# Patient Record
Sex: FEMALE | Race: WHITE
[De-identification: names, ages, dates, MRNs, and addresses within clinical notes are randomized per-mention and may not be internally consistent; named-entity substitution may affect disease eponyms.]

---

## 2021-10-07 ENCOUNTER — HOSPITAL ENCOUNTER (OUTPATIENT)
Dept: HOSPITAL 47 - FBPOP | Age: 26
Discharge: HOME | End: 2021-10-07
Attending: OBSTETRICS & GYNECOLOGY
Payer: COMMERCIAL

## 2021-10-07 VITALS
SYSTOLIC BLOOD PRESSURE: 140 MMHG | TEMPERATURE: 98.1 F | HEART RATE: 107 BPM | RESPIRATION RATE: 16 BRPM | DIASTOLIC BLOOD PRESSURE: 81 MMHG

## 2021-10-07 DIAGNOSIS — R55: ICD-10-CM

## 2021-10-07 DIAGNOSIS — O26.892: Primary | ICD-10-CM

## 2021-10-07 DIAGNOSIS — Z3A.22: ICD-10-CM

## 2021-10-07 LAB
BASOPHILS # BLD AUTO: 0 K/UL (ref 0–0.2)
BASOPHILS NFR BLD AUTO: 0 %
EOSINOPHIL # BLD AUTO: 0.1 K/UL (ref 0–0.7)
EOSINOPHIL NFR BLD AUTO: 1 %
ERYTHROCYTE [DISTWIDTH] IN BLOOD BY AUTOMATED COUNT: 3.91 M/UL (ref 3.8–5.4)
ERYTHROCYTE [DISTWIDTH] IN BLOOD: 13.7 % (ref 11.5–15.5)
GLUCOSE BLD-MCNC: 126 MG/DL (ref 75–99)
HCT VFR BLD AUTO: 35.1 % (ref 34–46)
HGB BLD-MCNC: 12.1 GM/DL (ref 11.4–16)
LYMPHOCYTES # SPEC AUTO: 1.7 K/UL (ref 1–4.8)
LYMPHOCYTES NFR SPEC AUTO: 15 %
MCH RBC QN AUTO: 31 PG (ref 25–35)
MCHC RBC AUTO-ENTMCNC: 34.6 G/DL (ref 31–37)
MCV RBC AUTO: 89.6 FL (ref 80–100)
MONOCYTES # BLD AUTO: 0.4 K/UL (ref 0–1)
MONOCYTES NFR BLD AUTO: 3 %
NEUTROPHILS # BLD AUTO: 9.2 K/UL (ref 1.3–7.7)
NEUTROPHILS NFR BLD AUTO: 80 %
PLATELET # BLD AUTO: 193 K/UL (ref 150–450)
WBC # BLD AUTO: 11.5 K/UL (ref 3.8–10.6)

## 2021-10-07 PROCEDURE — 96360 HYDRATION IV INFUSION INIT: CPT

## 2021-10-07 PROCEDURE — 96361 HYDRATE IV INFUSION ADD-ON: CPT

## 2021-10-07 PROCEDURE — 99214 OFFICE O/P EST MOD 30 MIN: CPT

## 2021-10-07 PROCEDURE — 85025 COMPLETE CBC W/AUTO DIFF WBC: CPT

## 2021-10-07 PROCEDURE — 96367 TX/PROPH/DG ADDL SEQ IV INF: CPT

## 2021-10-13 NOTE — P.MSEPDOC
Presenting Problems





- Arrival Data


Date of Arrival on Unit: 10/07/21


Time of Arrival on Unit: 10:32


Mode of Transport: Ambulatory





- Complaint


OB-Reason for Admission/Chief Complaint: Syncope/Fainting Spell


Comment: pt arrived c/o passing out at [facility] today pt got lighthedad and her 

friend lowered her to the floor





Prenatal Medical History





- Pregnancy Information


: 2


Para: 1


Term: 1


: 0


Abortions: Spontaneous or Elective: 0


Number of Living Children: 1





- Gestational Age


Gestational Age by VIANNEY (wks/days): 22 Weeks and 3 Days





Review of Systems





- Review of Systems


Constitutional: No problems


Breast: No problems


ENT: No problems


Cardiovascular: No problems


Respiratory: No problems


Gastrointestinal: No problems


Genitourinary: No problems


Musculoskeletal: No problems


Neurological: No problems


Skin: No problems





Vital Signs





- Temperature


Temperature: 98.1 F


Temperature Source: Oral





- Pulse


  ** Right Brachial


Pulse Rate: 107


Pulse Assessment Method: Automatic Cuff





- Respirations


Respiratory Rate: 16


Oxygen Delivery Method: Room Air


O2 Sat by Pulse Oximetry: 98





- Blood Pressure


  ** Right Arm


Blood Pressure: 140/81


Blood Pressure Mean: 100


Blood Pressure Source: Automatic Cuff





Medical Screen Scoring





- Cervical Exam


Membranes: Intact





- Uterine Contractions


Resting: Soft to palpation





- Fetal Assessment - Baby A


Baseline FHR: 140





Physician Notification





- Physician Notified


Physician Notified Date: 10/07/21


Physician Notified Time: 13:30


Physician: Dr Cason


New Order Received: Yes





- Notification Comment


Comment: may discharge to home with instructions and have her keep her scheduled

appoinment





Maternal Fetal Triage Index





- Maternal Fetal Triage Index


Presenting for scheduled procedure w/no complaint: No





- Stat/Priority 1


Stat Priority 1: No





- Urgent/Priority 2


Urgent Priority 2: No





- Prompt/Priority 3


Prompt Priority 3: No





- Non-Urgent/Priority 4


Non-Urgent Priority 4: Yes


Criteria Met for Priority 4: pt 22 3/7 weeks gestation. had a episode at [facility]

where she  passed out. B/P 140/81 PULSE 107   accuc check of 126 obtained. and 

cbc drawn and sent to lab and iv of d5lr started v/s retaken onB/P 116/59 Pulse 

92 DR Cason called with cbc results and orders receive may discharge pt to home 

with instructions





Disposition





- Disposition


OB Disposition: Physician follow up in office, Discharge to home


Discharge Date: 10/07/21


Discharge Time: 13:35


I agree with the RN Medical Screening Exam: Yes


Case reviewed; plan agreed upon as documented in EMR&OBIX.: Yes


Comments: 





Patient was neither seen nor examined by me


Diagnosis: PAIN, UNSPECIFIED

## 2021-11-11 ENCOUNTER — HOSPITAL ENCOUNTER (OUTPATIENT)
Dept: HOSPITAL 47 - FBPOP | Age: 26
LOS: 1 days | End: 2021-11-12
Attending: OBSTETRICS & GYNECOLOGY
Payer: COMMERCIAL

## 2021-11-11 DIAGNOSIS — Z3A.27: ICD-10-CM

## 2021-11-11 DIAGNOSIS — O46.92: Primary | ICD-10-CM

## 2021-11-11 DIAGNOSIS — Z88.1: ICD-10-CM

## 2021-11-11 PROCEDURE — 99213 OFFICE O/P EST LOW 20 MIN: CPT

## 2021-11-12 VITALS
DIASTOLIC BLOOD PRESSURE: 67 MMHG | SYSTOLIC BLOOD PRESSURE: 124 MMHG | TEMPERATURE: 97.7 F | HEART RATE: 119 BPM | RESPIRATION RATE: 16 BRPM

## 2021-11-12 NOTE — P.PN
Progress Note - Text


Progress Note Date: 21





this is a 26-year-old  2 para 1 woman who presented to labor and delivery

triage at 27-4/7 weeks gestation complaining of 2 days of vaginal bleeding.





She reports episodes of pink spotting over the last 48 hours.  No flow of bright

red blood.  More recently this evening she had some darker brown old blood type 

discharge.  She decided to come in for evaluation.  She has had ongoing and 

unrelenting pelvic pain throughout the pregnancy.  She reports this is below her

old  scar at the level of the pubic bone.  Her regular obstetrician is 

aware of this.  She reports the pain has not increased or changed in nature in 

light of the spotting.  She denies bowel or bladder symptoms.  She's not had 

recent intercourse or trauma.  She is very active at her job as a .





Throughout her evaluation on labor and delivery triage no active bleeding was 

noted.  Fetal heart tones were reassuring for gestational age and she was not 

yoandy.





I performed bedside exam.  The abdomen is gravid, soft and nontender.  She had 

no CVA tenderness.  She is exquisitely tender over the pubic symphysis 

consistent with pubic symphysis separation.  On speculum examination there is no

blood in the vault.  On bimanual examination the cervix is fingertip, firm, 30% 

effaced with no presenting part.  There is no blood on the exam glove.





Throughout her term evaluation there was no active bleeding noted.  All of her 

vital signs are stable.  She was therefore discharged home to pelvic rest and 

off of work until seen in follow-up in the office.   labor and bleeding 

precautions were reviewed in detail.





Greater than 30 minutes spent.

## 2021-12-14 ENCOUNTER — HOSPITAL ENCOUNTER (OUTPATIENT)
Dept: HOSPITAL 47 - FBPOP | Age: 26
Discharge: HOME | End: 2021-12-14
Attending: OBSTETRICS & GYNECOLOGY
Payer: COMMERCIAL

## 2021-12-14 VITALS
TEMPERATURE: 98.1 F | RESPIRATION RATE: 20 BRPM | SYSTOLIC BLOOD PRESSURE: 117 MMHG | DIASTOLIC BLOOD PRESSURE: 68 MMHG | HEART RATE: 115 BPM

## 2021-12-14 DIAGNOSIS — R60.0: ICD-10-CM

## 2021-12-14 DIAGNOSIS — Z3A.32: ICD-10-CM

## 2021-12-14 DIAGNOSIS — Z88.1: ICD-10-CM

## 2021-12-14 DIAGNOSIS — R51.9: ICD-10-CM

## 2021-12-14 DIAGNOSIS — O26.893: Primary | ICD-10-CM

## 2021-12-14 LAB
PH UR: 6 [PH] (ref 5–8)
PROT UR QL: (no result)
RBC UR QL: 2 /HPF (ref 0–5)
SP GR UR: 1.03 (ref 1–1.03)
SQUAMOUS UR QL AUTO: 3 /HPF (ref 0–4)
UROBILINOGEN UR QL STRIP: <2 MG/DL (ref ?–2)
WBC #/AREA URNS HPF: 2 /HPF (ref 0–5)

## 2021-12-14 PROCEDURE — 81001 URINALYSIS AUTO W/SCOPE: CPT

## 2021-12-14 PROCEDURE — 59025 FETAL NON-STRESS TEST: CPT

## 2021-12-14 PROCEDURE — 99213 OFFICE O/P EST LOW 20 MIN: CPT

## 2021-12-27 ENCOUNTER — HOSPITAL ENCOUNTER (OUTPATIENT)
Dept: HOSPITAL 47 - FBPOP | Age: 26
Discharge: HOME | End: 2021-12-27
Attending: OBSTETRICS & GYNECOLOGY
Payer: COMMERCIAL

## 2021-12-27 VITALS
RESPIRATION RATE: 18 BRPM | DIASTOLIC BLOOD PRESSURE: 79 MMHG | TEMPERATURE: 96.5 F | HEART RATE: 108 BPM | SYSTOLIC BLOOD PRESSURE: 136 MMHG

## 2021-12-27 DIAGNOSIS — Z03.79: Primary | ICD-10-CM

## 2021-12-27 DIAGNOSIS — Z88.1: ICD-10-CM

## 2021-12-27 PROCEDURE — 59025 FETAL NON-STRESS TEST: CPT

## 2021-12-27 PROCEDURE — 99213 OFFICE O/P EST LOW 20 MIN: CPT

## 2021-12-27 PROCEDURE — 84112 EVAL AMNIOTIC FLUID PROTEIN: CPT

## 2022-01-08 NOTE — P.MSEPDOC
Presenting Problems





- Arrival Data


Date of Arrival on Unit: 21


Time of Arrival on Unit: 12:04


Mode of Transport: Ambulatory





- Complaint


OB-Reason for Admission/Chief Complaint: Headache, Visual Disturbances


Comment: pt arrived c/o a headache times 4 days. with c/o edema in feet and also

c/o seeing spots





Prenatal Medical History





- Pregnancy Information


: 2


Para: 1


Term: 1


: 0


Abortions: Spontaneous or Elective: 0


Number of Living Children: 1





- Gestational Age


Gestational Age by VIANNEY (wks/days): 32 Weeks and 1 Days





Review of Systems





- Review of Systems


Constitutional: No problems


Breast: No problems


ENT: No problems


Cardiovascular: No problems


Respiratory: No problems


Gastrointestinal: No problems


Genitourinary: No problems


Musculoskeletal: No problems


Neurological: Dizziness


Skin: No problems





Vital Signs





- Temperature


Temperature: 98.1 F


Temperature Source: Oral





- Pulse


  ** Right Brachial


Pulse Rate: 115


Pulse Assessment Method: Automatic Cuff





- Respirations


Respiratory Rate: 20


Oxygen Delivery Method: Room Air


O2 Sat by Pulse Oximetry: 98





- Blood Pressure


  ** Right Arm


Blood Pressure: 117/68


Blood Pressure Mean: 84


Blood Pressure Source: Automatic Cuff





Medical Screen Scoring





- Fetal Assessment - Baby A


Baseline FHR: 150


Fetal Heart Rate - NICHD Category: Category I (Normal)





Physician Notification





- Physician Notified


Physician Notified Date: 21


Physician Notified Time: 12:55


Physician: dr rodriguez


New Order Received: Yes





- Notification Comment


Comment: may discharge to home with  instructions and have pt f/u with dr rodriguez 

next week. pt needs to call and make appoinment





Maternal Fetal Triage Index





- Non-Urgent/Priority 4


Non-Urgent Priority 4: Yes


Criteria Met for Priority 4: pt 32 1/7 weeks c/o h/a  b/p 117/68 temp 98.1 pulse

115. reactive NST





Disposition





- Disposition


OB Disposition: Physician follow up in office, Discharge to home


Discharge Date: 21


Discharge Time: 13:15


I agree with the RN Medical Screening Exam: Yes


Case reviewed; plan agreed upon as documented in EMR&OBIX.: Yes


Diagnosis: HEADACHE * DO NOT USE *

## 2022-01-15 ENCOUNTER — HOSPITAL ENCOUNTER (INPATIENT)
Dept: HOSPITAL 47 - FBPOP | Age: 27
LOS: 4 days | Discharge: HOME | End: 2022-01-19
Attending: OBSTETRICS & GYNECOLOGY | Admitting: OBSTETRICS & GYNECOLOGY
Payer: COMMERCIAL

## 2022-01-15 DIAGNOSIS — O36.63X0: ICD-10-CM

## 2022-01-15 DIAGNOSIS — Z88.1: ICD-10-CM

## 2022-01-15 DIAGNOSIS — N73.6: ICD-10-CM

## 2022-01-15 DIAGNOSIS — O36.8130: ICD-10-CM

## 2022-01-15 DIAGNOSIS — Z3A.37: ICD-10-CM

## 2022-01-15 DIAGNOSIS — O34.211: Primary | ICD-10-CM

## 2022-01-15 DIAGNOSIS — G43.909: ICD-10-CM

## 2022-01-15 DIAGNOSIS — O40.3XX0: ICD-10-CM

## 2022-01-15 LAB
APTT BLD: 21.8 SEC (ref 22–30)
BASOPHILS # BLD AUTO: 0 K/UL (ref 0–0.2)
BASOPHILS NFR BLD AUTO: 0 %
EOSINOPHIL # BLD AUTO: 0.1 K/UL (ref 0–0.7)
EOSINOPHIL NFR BLD AUTO: 1 %
ERYTHROCYTE [DISTWIDTH] IN BLOOD BY AUTOMATED COUNT: 4.2 M/UL (ref 3.8–5.4)
ERYTHROCYTE [DISTWIDTH] IN BLOOD: 15.8 % (ref 11.5–15.5)
HCT VFR BLD AUTO: 36.2 % (ref 34–46)
HGB BLD-MCNC: 12.2 GM/DL (ref 11.4–16)
INR PPP: 0.9 (ref ?–1.2)
LYMPHOCYTES # SPEC AUTO: 1.9 K/UL (ref 1–4.8)
LYMPHOCYTES NFR SPEC AUTO: 17 %
MCH RBC QN AUTO: 29.1 PG (ref 25–35)
MCHC RBC AUTO-ENTMCNC: 33.7 G/DL (ref 31–37)
MCV RBC AUTO: 86.2 FL (ref 80–100)
MONOCYTES # BLD AUTO: 0.4 K/UL (ref 0–1)
MONOCYTES NFR BLD AUTO: 4 %
NEUTROPHILS # BLD AUTO: 8.2 K/UL (ref 1.3–7.7)
NEUTROPHILS NFR BLD AUTO: 75 %
PLATELET # BLD AUTO: 186 K/UL (ref 150–450)
PT BLD: 10.1 SEC (ref 9–12)
WBC # BLD AUTO: 11 K/UL (ref 3.8–10.6)

## 2022-01-15 PROCEDURE — 85730 THROMBOPLASTIN TIME PARTIAL: CPT

## 2022-01-15 PROCEDURE — 59025 FETAL NON-STRESS TEST: CPT

## 2022-01-15 PROCEDURE — 85610 PROTHROMBIN TIME: CPT

## 2022-01-15 PROCEDURE — 4A0HXCZ MEASUREMENT OF PRODUCTS OF CONCEPTION, CARDIAC RATE, EXTERNAL APPROACH: ICD-10-PCS

## 2022-01-15 PROCEDURE — 99213 OFFICE O/P EST LOW 20 MIN: CPT

## 2022-01-15 PROCEDURE — 86900 BLOOD TYPING SEROLOGIC ABO: CPT

## 2022-01-15 PROCEDURE — 86850 RBC ANTIBODY SCREEN: CPT

## 2022-01-15 PROCEDURE — 86901 BLOOD TYPING SEROLOGIC RH(D): CPT

## 2022-01-15 PROCEDURE — 83036 HEMOGLOBIN GLYCOSYLATED A1C: CPT

## 2022-01-15 PROCEDURE — 85025 COMPLETE CBC W/AUTO DIFF WBC: CPT

## 2022-01-15 RX ADMIN — POTASSIUM CHLORIDE SCH MLS/HR: 14.9 INJECTION, SOLUTION INTRAVENOUS at 23:44

## 2022-01-15 RX ADMIN — DIPHENHYDRAMINE HYDROCHLORIDE PRN MG: 50 INJECTION, SOLUTION INTRAMUSCULAR; INTRAVENOUS at 14:27

## 2022-01-15 RX ADMIN — DOCUSATE SODIUM AND SENNOSIDES SCH: 50; 8.6 TABLET ORAL at 20:15

## 2022-01-15 RX ADMIN — POTASSIUM CHLORIDE SCH MLS/HR: 14.9 INJECTION, SOLUTION INTRAVENOUS at 14:28

## 2022-01-15 RX ADMIN — ACETAMINOPHEN SCH MG: 500 TABLET ORAL at 20:14

## 2022-01-15 RX ADMIN — DIPHENHYDRAMINE HYDROCHLORIDE PRN MG: 50 INJECTION, SOLUTION INTRAMUSCULAR; INTRAVENOUS at 20:15

## 2022-01-15 RX ADMIN — IBUPROFEN SCH MG: 600 TABLET ORAL at 23:44

## 2022-01-15 RX ADMIN — IBUPROFEN SCH: 600 TABLET ORAL at 20:38

## 2022-01-15 NOTE — P.HPOB
History of Present Illness


H&P Date: 01/15/22


Chief Complaint: Decreased fetal movement and back pain





This is a 26-year-old  3 para 1011 woman with an estimated due date of 

2022 based on first trimester ultrasound.  She presents at 36-5/7 weeks 

gestation complaining of decreased fetal movement for approximately 12 hours and

increasing low back and abdominal pain.  Upon evaluation in labor and delivery 

triage she has a category 1 fetal heart rate tracing and does report feeling 

fetal movement.  She is however found to be yoandy regularly on the monitor

and does describe low back pain worsening over the last 24 hours and wrapping 

around to the front.  She describes increasing pelvic pressure.  On examination 

she is found to be 3+ centimeters dilated, 70% effaced in the vertex in the -1 

station.  This is a change from recent exam in which she reported she was told 

she was 1 cm dilated.





Pregnancy has been complicated by large for gestational age infant based on 

recent ultrasound.  Estimated fetal weight at 36 weeks was greater than the 90th

percentile with an DK of 28.8 cm.





Previous pregnancy was delivered by primary low transverse  section in 

 after a failed vacuum extraction.  Infant weighed 10 pounds.  The patient 

reports that she was in the hospital for 6 days and did receive a blood 

transfusion intraoperatively for bleeding.  The infant also had an undiagnosed 

cardiac defect and did require prolonged [facility].  This was in [place].  She has been previously counseled on in the 

office setting regarding options and does decline a trial of labor.  She has 

been consented for a repeat low transverse  section.  Risks of the C-

section are reviewed with the patient today and include bleeding, transfusion, 

infection, injury to maternal or infant, DVT, PE, implications for future 

pregnancies and/or anesthesia complications.  The patient understands these 

risks and has been counseled previously in the office as well.  Consent is 

obtained.





Laboratory data: Blood type O+, antibody screen negative, rubella immune, VDRL 

nonreactive, hepatitis B surface antigen negative, gonorrhea and clinic cultures

negative, HIV negative, glucose tolerance testing within normal limits.





Review of Systems


All systems: negative





Past Medical History


Additional Past Medical History / Comment(s): Migraine headaches


History of Any Multi-Drug Resistant Organisms: None Reported


Past Surgical History:  Section (2016)


Past Anesthesia/Blood Transfusion Reactions: No Reported Reaction


Past Psychological History: No Psychological Hx Reported


Smoking Status: Never smoker


Past Alcohol Use History: None Reported


Past Drug Use History: None Reported





Medications and Allergies


                                Home Medications











 Medication  Instructions  Recorded  Confirmed  Type


 


Pedi Multivit No.25/Folic Acid 2 tab PO DAILY 10/07/21 01/15/22 History





[Flintstones Multivit Chew Tab]    








                                    Allergies











Allergy/AdvReac Type Severity Reaction Status Date / Time


 


amoxicillin Allergy  Rash/Hives Verified 01/15/22 10:23














Exam


                                Intake and Output











 01/14/22 01/15/22 01/15/22





 22:59 06:59 14:59


 


Other:   


 


  Weight   127.006 kg














This is a pleasant, visibly gravid  female in no obvious distress.  

HEENT ENT exam is unremarkable.  Her breathing is unlabored and her heart is a 

regular rate and rhythm.  The abdomen is gravid with size significantly greater 

then stated gestational age.  On pelvic examination she is 3+ centimeters 

dilated, 70% effaced vertex in the -1 station.  She has 1+ bilateral lower 

extremity edema.  Fetal heart tones are category 1.  She is yoandy every 1-

4 minutes.





Assessment and Plan


(1) 36 to 37 weeks gestation of pregnancy


Current Visit: Yes   Status: Acute   Code(s): WAD1540 -    SNOMED Code(s): 

009714305


   





(2) History of 


Current Visit: Yes   Status: Acute   Code(s): Z98.891 - HISTORY OF UTERINE SCAR 

FROM PREVIOUS SURGERY   SNOMED Code(s): 239184441


   





(3) Polyhydramnios


Current Visit: Yes   Status: Acute   Code(s): O40.9XX0 - POLYHYDRAMNIOS, UNSP 

TRIMESTER, NOT APPLICABLE OR UNSP   SNOMED Code(s): 33572043


   





(4) Spontaneous onset of labor


Current Visit: Yes   Status: Acute   Code(s): KST2876 -    SNOMED Code(s): 

21307488


   


Plan: 





26-year-old  3 para 1011 woman who is admitted at 36-5/7 weeks gestation 

in early active labor.  She has a history of a previous low transverse  

section and declines trial of labor.  Pregnancy has been, located by large for 

gestational age estimated fetal weight on recent ultrasound as well as 

polyhydramnios.  She reports a history of hemorrhage with previous  

section 5 years ago.  Risks benefits and alternatives of repeat  have 

been reviewed with the patient in detail, please see the documented discussion 

above.  Consent is obtained.  The anesthesiologist has been notified as has the 

 nursery.  We'll proceed towards repeat low transverse  section.

## 2022-01-15 NOTE — P.OP
Date of Procedure: 01/15/22


Preoperative Diagnosis: 


Intrauterine pregnancy at 36-5/7 weeks


Spontaneous labor


History of previous low transverse  section


Polyhydramnios


Suspected large for gestational age infant


Postoperative Diagnosis: 


Uterine pregnancy at 36-5/7 weeks


Spontaneous labor


History of previous low transverse  section


Polyhydramnios


LGA


Intra-abdominal adhesions


Procedure(s) Performed: 


repeat low transverse  section


Anesthesia: spinal


Surgeon: Maame Monae


Assistant #1: Nitin Gandhi


Estimated Blood Loss (ml): 400


IV fluids (ml): 1,200


Urine output (ml): 400


Pathology: none sent


Condition: stable


Disposition: floor


Indications for Procedure: 


26-year-old  3 para 1011 woman who presented at 36-5/7 weeks gestation in

spontaneous active labor.  Should a history of a previous low transverse 

 section and declines trial of labor.


Operative Findings: 


Female infant in the vertex occiput transverse position with Apgars of 7 at 1 

minute and 9 at 5 minutes weighing 8 lbs. 9 oz., 3890 g.  Extensive intra-

abdominal adhesions involving the omentum to the anterior of the uterus, the 

right ovary and fallopian tube to the posterior portion of the uterus and dense 

adhesions of the bladder to the low uterine segment.


Description of Procedure: 


Patient was taken to the operating room where spinal anesthetic was administered

without incident.  Appropriate timeout procedure was undertaken.  On she was 

positioned, prepped and draped in the dorsal supine position with a Nolen 

catheter in place.  After anesthetic was confirmed adequate a low transverse 

skin incision was made and carried down to the underlying fascia sharply.  The 

fascia was incised in the midline and extended bilaterally with the Pratt 

scissors.  The rectus muscles were densely adherent in the midline.  Hemostats 

were utilized to elevate the rectus muscles in the midline and scalpel was 

utilized to enter the peritoneum.  The peritoneum was entered and the incision 

was extended inferiorly and superiorly.  There is noted to be omentum adherent 

to the entire right on fundal to midportion of the uterus.  The bladder blade 

was placed and the vesicouterine peritoneum was identified.  The bladder was 

noted to be densely adherent to the low uterine segment therefore the uterine 

incision was made well above this.  The uterine incision was then carried down 

to the underlying membranes sharply.  The membranes were ruptured and copious 

clear fluid was noted.  The uterine incision was extended bilaterally bluntly.  

The head was delivered from the incision and the nose and mouth were bulb 

suctioned.  The rest the infant was delivered onto the field and the nose and 

mouth were further bulb suctioned.  The cord was clamped and cut and the infant 

was taken to the warmer.  An intact, three-vessel cord placenta was then 

manually removed.  The uterus was carefully removed from the abdomen and there 

is noted to be extensive heart scarring of the right adnexa to the posterior 

portion of the right uterus.  The omentum was noted to be very adherent to the 

right fundal and right midportion of the uterus as well.  The uterine incision 

was delineated with Smiley's after the uterus was cleared of all clot and 

debris.  The uterine incision was closed in a running 5 locked fashion with 0 

Vicryl suture followed by second imbricating layer of the same.  There was noted

to be active bleeding from a venous sinus essentially at the dome of the bladder

where it was adherent to the low uterine segment.  This was very carefully 

exposed the bleeding was felt to be superficial venous sinus.  3-0 Vicryl suture

was utilized to very carefully place figure-of-eight sutures 2 in this area and

hemostasis was noted.  The entire area was vascular.  Surgicel powder was then 

placed over the entire low uterine segment and dome of the bladder.  Hemostasis 

was noted.  The uterus had been returned to the abdomen prior to this.  The 

gutters were cleared of all clot and debris on the left side the right side was 

not secondary to adhesions.  The peritoneum was then reapproximated in the 

midline with 2-0 Vicryl suture.  The rectus muscles and fascial edges and p

eritoneal edges had been inspected and are noted to be hemostatic.  The fascia 

was then closed in a running fashion with 0 Vicryl suture.  The subcuticular 

tissue is irrigated and reapproximated with 3-0 chromic.  The skin was then 

closed in a subcutaneous fashion with 4-0 Vicryl suture.  All counts reported to

me as correct by the operating room staff.  Nolen catheter is in place and 

copious clear urine is noted in the tubing at the end of the procedure.  The 

patient was transported recovery area in good condition.

## 2022-01-16 LAB
BASOPHILS # BLD AUTO: 0 K/UL (ref 0–0.2)
BASOPHILS NFR BLD AUTO: 0 %
EOSINOPHIL # BLD AUTO: 0.1 K/UL (ref 0–0.7)
EOSINOPHIL NFR BLD AUTO: 1 %
ERYTHROCYTE [DISTWIDTH] IN BLOOD BY AUTOMATED COUNT: 3.7 M/UL (ref 3.8–5.4)
ERYTHROCYTE [DISTWIDTH] IN BLOOD: 15.4 % (ref 11.5–15.5)
HCT VFR BLD AUTO: 32.1 % (ref 34–46)
HGB BLD-MCNC: 11 GM/DL (ref 11.4–16)
LYMPHOCYTES # SPEC AUTO: 2.7 K/UL (ref 1–4.8)
LYMPHOCYTES NFR SPEC AUTO: 18 %
MCH RBC QN AUTO: 29.7 PG (ref 25–35)
MCHC RBC AUTO-ENTMCNC: 34.3 G/DL (ref 31–37)
MCV RBC AUTO: 86.8 FL (ref 80–100)
MONOCYTES # BLD AUTO: 1 K/UL (ref 0–1)
MONOCYTES NFR BLD AUTO: 7 %
NEUTROPHILS # BLD AUTO: 11.2 K/UL (ref 1.3–7.7)
NEUTROPHILS NFR BLD AUTO: 73 %
PLATELET # BLD AUTO: 188 K/UL (ref 150–450)
WBC # BLD AUTO: 15.2 K/UL (ref 3.8–10.6)

## 2022-01-16 RX ADMIN — ACETAMINOPHEN SCH MG: 500 TABLET ORAL at 09:00

## 2022-01-16 RX ADMIN — IBUPROFEN SCH MG: 600 TABLET ORAL at 11:51

## 2022-01-16 RX ADMIN — POTASSIUM CHLORIDE SCH: 14.9 INJECTION, SOLUTION INTRAVENOUS at 06:07

## 2022-01-16 RX ADMIN — IBUPROFEN SCH MG: 600 TABLET ORAL at 06:06

## 2022-01-16 RX ADMIN — DOCUSATE SODIUM AND SENNOSIDES SCH EACH: 50; 8.6 TABLET ORAL at 09:00

## 2022-01-16 RX ADMIN — ACETAMINOPHEN SCH MG: 500 TABLET ORAL at 01:41

## 2022-01-16 RX ADMIN — POTASSIUM CHLORIDE SCH: 14.9 INJECTION, SOLUTION INTRAVENOUS at 16:51

## 2022-01-16 RX ADMIN — IBUPROFEN SCH MG: 600 TABLET ORAL at 23:38

## 2022-01-16 RX ADMIN — DOCUSATE SODIUM AND SENNOSIDES SCH: 50; 8.6 TABLET ORAL at 20:16

## 2022-01-16 RX ADMIN — IBUPROFEN SCH MG: 600 TABLET ORAL at 18:25

## 2022-01-16 RX ADMIN — ACETAMINOPHEN SCH MG: 500 TABLET ORAL at 14:20

## 2022-01-16 RX ADMIN — ACETAMINOPHEN SCH MG: 500 TABLET ORAL at 19:35

## 2022-01-16 NOTE — P.PNOBGPC
Subjective





- Subjective


Principal diagnosis: Postop day 1


Interval history: 





Feeling well.  Nolen catheter remained in overnight secondary to concerns for 

bleeding and scarring at the dome of the bladder intraoperatively.  Clear urine 

is noted.  Infant is in the nursery with a elevated white blood cell count.


Patient reports: Reports appetite normal, Reports pain well controlled, Reports 

ambulating normally, Denies dizzy ambulation, Denies nauseated


: doing well, other (Nursery)





Objective





- Vital Signs


Latest vital signs: 


                                   Vital Signs











  Temp Pulse Resp BP Pulse Ox


 


 22 08:00  97.5 F L  103 H  16  106/71  99


 


 22 04:00  98.0 F  75  16  136/88 


 


 01/15/22 23:48  98.3 F  97  16  128/75 


 


 01/15/22 20:00  98.0 F  112 H  16  109/67 


 


 01/15/22 15:56   90  16  112/55  96


 


 01/15/22 15:26   100  16  111/56  96


 


 01/15/22 14:56   107 H  16  110/58  98


 


 01/15/22 14:41   101 H  16  107/58  95


 


 01/15/22 14:26   90  16  107/57  98


 


 01/15/22 14:11   95  16  102/52  98


 


 01/15/22 13:56  97.8 F  94  16  90/45  97


 


 01/15/22 11:15  97.5 F L  126 H  16  126/74  98








                                Intake and Output











 01/15/22 01/16/22 01/16/22





 22:59 06:59 14:59


 


Output Total 350 1300 500


 


Balance -350 -1300 -500


 


Output:   


 


  Urine 350 1300 500


 


Other:   


 


  Voiding Method  Indwelling Catheter Indwelling Catheter














- Exam


Extremities: Present: normal, edema


Abdomen: Present: normal appearance, soft.  Absent: distention, tenderness


Incision: Present: normal, dry, intact, dressed


Uterus: Present: normal, firm.  Absent: tenderness





- Labs


Labs: 


                  Abnormal Lab Results - Last 24 Hours (Table)











  01/15/22 01/15/22 01/16/22 Range/Units





  11:15 11:28 05:56 


 


WBC  11.0 H   15.2 H  (3.8-10.6)  k/uL


 


RBC    3.70 L  (3.80-5.40)  m/uL


 


Hgb    11.0 L  (11.4-16.0)  gm/dL


 


Hct    32.1 L  (34.0-46.0)  %


 


RDW  15.8 H    (11.5-15.5)  %


 


Neutrophils #  8.2 H   11.2 H  (1.3-7.7)  k/uL


 


APTT   21.8 L   (22.0-30.0)  sec














Assessment and Plan


(1) 36 to 37 weeks gestation of pregnancy


Current Visit: Yes   Status: Acute   Code(s): EWT2515 -    SNOMED Code(s): 

797236312


   





(2) History of 


Current Visit: Yes   Status: Acute   Code(s): Z98.891 - HISTORY OF UTERINE SCAR 

FROM PREVIOUS SURGERY   SNOMED Code(s): 339135526


   





(3) Polyhydramnios


Current Visit: Yes   Status: Acute   Code(s): O40.9XX0 - POLYHYDRAMNIOS, UNSP 

TRIMESTER, NOT APPLICABLE OR UNSP   SNOMED Code(s): 09061195


   





(4) Spontaneous onset of labor


Current Visit: Yes   Status: Acute   Code(s): KAZ1509 -    SNOMED Code(s): 

92698607


   


Plan: 





Postop day 1 status post repeat low transverse  section.  We'll 

discontinue Nolen catheter to day.  Increase ambulation and routine care 

otherwise.

## 2022-01-16 NOTE — P.PN
Progress Note - Text





22  722


76-year-old female status post  with spinal Duramorph.  Patient seen 

and evaluated this morning for postop pain control, patient has a VAS of 1 with 

no complains of nausea vomiting.  Patient does have complaints of pruritus which

should subside in 24 hours.

## 2022-01-17 RX ADMIN — DOCUSATE SODIUM AND SENNOSIDES SCH: 50; 8.6 TABLET ORAL at 09:49

## 2022-01-17 RX ADMIN — DOCUSATE SODIUM AND SENNOSIDES SCH: 50; 8.6 TABLET ORAL at 19:45

## 2022-01-17 RX ADMIN — ACETAMINOPHEN SCH MG: 500 TABLET ORAL at 22:27

## 2022-01-17 RX ADMIN — IBUPROFEN SCH MG: 600 TABLET ORAL at 12:56

## 2022-01-17 RX ADMIN — ACETAMINOPHEN SCH: 500 TABLET ORAL at 02:03

## 2022-01-17 RX ADMIN — ACETAMINOPHEN SCH MG: 500 TABLET ORAL at 16:11

## 2022-01-17 RX ADMIN — IBUPROFEN SCH: 600 TABLET ORAL at 09:50

## 2022-01-17 RX ADMIN — IBUPROFEN SCH MG: 600 TABLET ORAL at 05:32

## 2022-01-17 RX ADMIN — IBUPROFEN SCH MG: 600 TABLET ORAL at 19:44

## 2022-01-17 RX ADMIN — ACETAMINOPHEN SCH MG: 500 TABLET ORAL at 09:06

## 2022-01-17 NOTE — P.PNOBGPC
Subjective





- Subjective


Principal diagnosis: Postop day 2


Interval history: 





Patient is seen well in the nursery visiting the baby.  Baby is being monitored 

for elevated white blood cell count and receiving antibiotic therapy.  Cultures 

are pending.


Patient reports: Reports appetite normal, Reports voiding normally, Reports pain

well controlled, Reports ambulating normally, Denies dizzy ambulation


[place]: doing well





Objective





- Vital Signs


Latest vital signs: 


                                   Vital Signs











  Temp Pulse Resp BP Pulse Ox


 


 22 00:00  98.1 F  89  16  129/78 


 


 22 16:00  97.5 F L  104 H  16  132/77  97


 


 22 13:00  97.5 F L  96  16  107/69  98








                                Intake and Output











 22





 22:59 06:59 14:59


 


Other:   


 


  # Voids 1 2 














Assessment and Plan


(1) 36 to 37 weeks gestation of pregnancy


Current Visit: Yes   Status: Acute   Code(s): IOG7064 -    SNOMED Code(s): 

566141834


   





(2) History of 


Current Visit: Yes   Status: Acute   Code(s): Z98.891 - HISTORY OF UTERINE SCAR 

FROM PREVIOUS SURGERY   SNOMED Code(s): 705262789


   





(3) Polyhydramnios


Current Visit: Yes   Status: Acute   Code(s): O40.9XX0 - POLYHYDRAMNIOS, UNSP 

TRIMESTER, NOT APPLICABLE OR UNSP   SNOMED Code(s): 36621290


   





(4) Spontaneous onset of labor


Current Visit: Yes   Status: Acute   Code(s): GRV4681 -    SNOMED Code(s): 

25797710


   


Plan: 





Postop day 2 status post repeat low transverse  section.  Patient is not

examined today as she is in the nursery visiting the baby.  Patient reports 

incision is well-healing and lochia was scant.  Anticipate possible discharge 

home tomorrow pending physical exam evaluation.

## 2022-01-18 RX ADMIN — IBUPROFEN SCH MG: 600 TABLET ORAL at 04:36

## 2022-01-18 RX ADMIN — DIMETHICONE PRN MG: 80 TABLET, CHEWABLE ORAL at 00:54

## 2022-01-18 RX ADMIN — DOCUSATE SODIUM AND SENNOSIDES SCH: 50; 8.6 TABLET ORAL at 08:58

## 2022-01-18 RX ADMIN — ACETAMINOPHEN SCH MG: 500 TABLET ORAL at 14:40

## 2022-01-18 RX ADMIN — ACETAMINOPHEN SCH: 500 TABLET ORAL at 12:27

## 2022-01-18 RX ADMIN — DIMETHICONE PRN MG: 80 TABLET, CHEWABLE ORAL at 14:40

## 2022-01-18 RX ADMIN — DIMETHICONE PRN MG: 80 TABLET, CHEWABLE ORAL at 08:20

## 2022-01-18 RX ADMIN — IBUPROFEN SCH MG: 600 TABLET ORAL at 19:11

## 2022-01-18 RX ADMIN — DIMETHICONE PRN MG: 80 TABLET, CHEWABLE ORAL at 19:14

## 2022-01-18 RX ADMIN — ACETAMINOPHEN SCH MG: 500 TABLET ORAL at 22:16

## 2022-01-18 RX ADMIN — IBUPROFEN SCH: 600 TABLET ORAL at 08:58

## 2022-01-18 RX ADMIN — ACETAMINOPHEN SCH MG: 500 TABLET ORAL at 08:20

## 2022-01-18 RX ADMIN — DOCUSATE SODIUM AND SENNOSIDES SCH: 50; 8.6 TABLET ORAL at 20:20

## 2022-01-18 RX ADMIN — IBUPROFEN SCH MG: 600 TABLET ORAL at 11:22

## 2022-01-18 RX ADMIN — ACETAMINOPHEN SCH: 500 TABLET ORAL at 08:26

## 2022-01-18 NOTE — P.PNOBGPC
Subjective





- Subjective


Principal diagnosis: Postop day 3


Interval history: 





Feeling well.  Complaining of gas pain.  She has had multiple bowel movements 

since delivery.


Patient reports: Reports appetite normal, Reports voiding normally, Reports pain

well controlled, Reports ambulating normally, Denies dizzy ambulation, Denies 

nauseated


[place]: other (In special care nursery, jaundice)





Objective





- Vital Signs


Latest vital signs: 


                                   Vital Signs











  Temp Pulse Resp BP


 


 22 00:00  97.1 F L  90  16  134/82


 


 22 16:00  98.1 F  98  16  130/74








                                Intake and Output











 22





 22:59 06:59 14:59


 


Other:   


 


  # Voids 1 2 














- Exam


Extremities: Present: edema


Abdomen: Present: normal appearance, soft, distention


Incision: Present: normal, dry, intact.  Absent: erythematous


Uterus: Present: normal, firm.  Absent: tenderness





Assessment and Plan


(1) 36 to 37 weeks gestation of pregnancy


Current Visit: Yes   Status: Acute   Code(s): BSB6164 -    SNOMED Code(s): 

423722197


   





(2) History of 


Current Visit: Yes   Status: Acute   Code(s): Z98.891 - HISTORY OF UTERINE SCAR 

FROM PREVIOUS SURGERY   SNOMED Code(s): 943315741


   





(3) Polyhydramnios


Current Visit: Yes   Status: Acute   Code(s): O40.9XX0 - POLYHYDRAMNIOS, UNSP 

TRIMESTER, NOT APPLICABLE OR UNSP   SNOMED Code(s): 93327018


   





(4) Spontaneous onset of labor


Current Visit: Yes   Status: Acute   Code(s): ZPH7677 -    SNOMED Code(s): 

88404124


   


Plan: 





Postop day 3 status post repeat low transverse  section.  Infant remains

in special care nursery.  Probable discharge home tomorrow.

## 2022-01-19 VITALS — HEART RATE: 94 BPM | TEMPERATURE: 98.2 F | SYSTOLIC BLOOD PRESSURE: 138 MMHG | DIASTOLIC BLOOD PRESSURE: 79 MMHG

## 2022-01-19 VITALS — RESPIRATION RATE: 16 BRPM

## 2022-01-19 RX ADMIN — DIMETHICONE PRN MG: 80 TABLET, CHEWABLE ORAL at 02:36

## 2022-01-19 RX ADMIN — DOCUSATE SODIUM AND SENNOSIDES SCH: 50; 8.6 TABLET ORAL at 10:27

## 2022-01-19 RX ADMIN — IBUPROFEN SCH: 600 TABLET ORAL at 16:27

## 2022-01-19 RX ADMIN — ACETAMINOPHEN SCH MG: 500 TABLET ORAL at 13:08

## 2022-01-19 RX ADMIN — IBUPROFEN SCH: 600 TABLET ORAL at 10:27

## 2022-01-19 RX ADMIN — IBUPROFEN SCH MG: 600 TABLET ORAL at 02:36

## 2022-01-19 RX ADMIN — ACETAMINOPHEN SCH MG: 500 TABLET ORAL at 06:15

## 2022-01-24 NOTE — P.MSEPDOC
Presenting Problems





- Arrival Data


Date of Arrival on Unit: 21


Time of Arrival on Unit: 20:17


Mode of Transport: Ambulatory





- Complaint


OB-Reason for Admission/Chief Complaint: Rule Out SROM





Prenatal Medical History





- Pregnancy Information


: 2


Para: 1


Term: 1


: 0


Abortions: Spontaneous or Elective: 0


Number of Living Children: 1





- Gestational Age


Gestational Age by VIANNEY (wks/days): 34 Weeks and 0 Days





- Prenatal History


Pregnancy Complications: Prior 





Review of Systems





- Review of Systems


Constitutional: No problems


Breast: No problems


ENT: No problems


Cardiovascular: No problems


Respiratory: No problems


Gastrointestinal: No problems


Genitourinary: No problems


Musculoskeletal: No problems


Neurological: No problems


Skin: No problems





Vital Signs





- Temperature


Temperature: 96.5 F


Temperature Source: Temporal Artery Scan





- Pulse


  ** Pulse Oximetery


Pulse Rate: 108


Pulse Assessment Method: Pulse Oximetry





- Respirations


Respiratory Rate: 18


Oxygen Delivery Method: Room Air


O2 Sat by Pulse Oximetry: 99





- Blood Pressure


  ** Right Arm Supine


Blood Pressure: 136/79


Blood Pressure Mean: 98


Blood Pressure Source: Automatic Cuff





Medical Screen Scoring





- Cervical Exam


Dilation (cm): 1


Effacement (%): 50


Station: -3


Membranes: Intact





- Uterine Contractions


Intensity: Absent


Resting: Soft to palpation





- Fetal Assessment - Baby A


Baseline FHR: 145


Fetal Heart Rate - NICHD Category: Category I (Normal)


NST: Reactive





Physician Notification





- Physician Notified


Physician Notified Date: 21


Physician Notified Time: 21:34


Physician: Pualo Byrd Order Received: Yes





- Notification Comment


Comment: Dr. Byrd notified of pt arrival to triage. RN reported on maternal

VS, FHTs, amnisure results, and SVE. POC discussed at this time. Pt.  okay to 

D/C home.





Maternal Fetal Triage Index





- Maternal Fetal Triage Index


Presenting for scheduled procedure w/no complaint: No





- Stat/Priority 1


Stat Priority 1: No





- Urgent/Priority 2


Urgent Priority 2: No





- Prompt/Priority 3


Prompt Priority 3: Yes


Criteria Met for Priority 3: Pt  with c/o SROM here at 34.0 weeks gestation





Disposition





- Disposition


OB Disposition: Discharge to home


Discharge Date: 21


Discharge Time: 21:45


I agree with the RN Medical Screening Exam: Yes


Physician's MSE Comment: 





I have neither seen nor examined the patient.


Case reviewed; plan agreed upon as documented in EMR&OBIX.: Yes


Diagnosis: PREGNANCY RELATED CONDITIONS, UNSPECIFIED, THIRD TRIMESTER

## 2023-01-02 ENCOUNTER — HOSPITAL ENCOUNTER (EMERGENCY)
Dept: HOSPITAL 47 - EC | Age: 28
Discharge: HOME | End: 2023-01-02
Payer: COMMERCIAL

## 2023-01-02 VITALS
RESPIRATION RATE: 20 BRPM | DIASTOLIC BLOOD PRESSURE: 89 MMHG | HEART RATE: 107 BPM | SYSTOLIC BLOOD PRESSURE: 133 MMHG | TEMPERATURE: 98.4 F

## 2023-01-02 DIAGNOSIS — H10.32: Primary | ICD-10-CM

## 2023-01-02 DIAGNOSIS — Z88.0: ICD-10-CM

## 2023-01-02 PROCEDURE — 99282 EMERGENCY DEPT VISIT SF MDM: CPT

## 2023-01-02 NOTE — ED
Eye Problem HPI





- General


Stated complaint: Pink Eye


Time Seen by Provider: 23 09:48


Source: patient


Mode of arrival: ambulatory


Limitations: no limitations





- History of Present Illness


Initial comments: 


27-year-old female presents emergency Department chief complaint left eye 

irritation.  Patient states started overnight.  Patient states that she had some

old eyedrops from her Lasix and which she uses.  She states that seemed to help 

some.  Patient states that she did get some drainage on the left eye.  No visual

disturbance.  Patient had prior Lasix.  Patient denies any foreign body 

sensation.








- Related Data


                                Home Medications











 Medication  Instructions  Recorded  Confirmed


 


Pedi Multivit No.25/Folic Acid 2 tab PO DAILY 10/07/21 01/15/22





[Flintstones Multivit Chew Tab]   








                                  Previous Rx's











 Medication  Instructions  Recorded


 


Tobramycin 0.3% Ophth Soln [Tobrex 1 drop LEFT EYE Q4H #5 ml 23





0.3% Ophth Soln]  











                                    Allergies











Allergy/AdvReac Type Severity Reaction Status Date / Time


 


amoxicillin Allergy  Rash/Hives Verified 23 09:49














Review of Systems


ROS Statement: 


Those systems with pertinent positive or pertinent negative responses have been 

documented in the HPI.





ROS Other: All systems not noted in ROS Statement are negative.





Past Medical History


Additional Past Medical History / Comment(s): Migraine headaches


History of Any Multi-Drug Resistant Organisms: None Reported


Past Surgical History:  Section (2016)


Past Anesthesia/Blood Transfusion Reactions: No Reported Reaction


Past Psychological History: No Psychological Hx Reported


Smoking Status: Never smoker


Past Alcohol Use History: None Reported


Past Drug Use History: None Reported





General Exam


Limitations: no limitations


General appearance: alert, in no apparent distress


Head exam: Present: atraumatic, normocephalic, normal inspection


Eye exam: Present: PERRL, EOMI, conjunctival injection (Left mild drainage).  

Absent: normal appearance, scleral icterus, periorbital swelling


ENT exam: Present: normal exam, normal oropharynx, mucous membranes moist


Neck exam: Present: normal inspection, full ROM.  Absent: tenderness, 

meningismus, lymphadenopathy


Respiratory exam: Present: normal lung sounds bilaterally.  Absent: respiratory 

distress, wheezes, rales, rhonchi, stridor





Course


                                   Vital Signs











  23





  09:47


 


Temperature 98.4 F


 


Pulse Rate 107 H


 


Respiratory 20





Rate 


 


Blood Pressure 133/89


 


O2 Sat by Pulse 98





Oximetry 














Medical Decision Making





- Medical Decision Making


Was pt. sent in by a medical professional or institution?


@  -no


Did you speak to anyone other than the patient for history?  


@  -no


Did you review nursing and triage notes? 


@  -agree and reviewed


Were old charts reviewed? 


@  -no


Differential Diagnosis? 


@  Conjunctivitis- viral, bacterial, ALLERGIC, foreign body, corneal abrasion, 

list is not all inclusive


EKG interpreted by me (3pts min.)?


@  -no


X-rays interpreted by me (1pt min.)?


@  -no


CT interpreted by me (1pt min.)?


@  -no


U/S interpreted by me (1pt. min.)?


@  -no


What testing was considered but not performed? (CT, X-rays, U/S, labs)? Why?


@  no


What meds were considered but not given? Why?


@  -no


Did you discuss the management of the patient with other professionals?


@  -no


Did you reconcile home meds?


@  -no


Was smoking cessation discussed for >3mins.?


@  -no


Was critical care preformed (if so, how long)?


@  -no


Were there social determinants of health that impacted care today? How? 

(Homelessness, low income, unemployed, alcoholism, drug addiction, 

transportation, low edu. Level, literacy, decrease access to med. care, senior living, 

rehab)?


@  -no


Was there de-escalation of care discussed even if they declined? (Discuss DNR or

withdrawal of care, Hospice)?


@  -no


What co-morbidities impacted this encounter? (DM, HTN, Smoking, COPD, CAD, 

Cancer, CVA, Hep., AIDS, mental health diagnosis, sleep apnea, morbid obesity)?


@  -no


Was patient admitted / discharged?


@  -Discharged 


Undiagnosed new problem with uncertain prognosis?


@  -no


Drug Therapy requiring intensive monitoring for toxicity (Heparin, Nitro, 

Insulin, Cardizem)?


@  -no


Were any procedures done?


@  -no


Diagnosis/symptom?


@  -Conjunctivitis


Acute, or Chronic, or Acute on Chronic?


@  - acute


Uncomplicated (without systemic symptoms) or Complicated (systemic symptoms)?


@  -Uncomplicated


Side effects of treatment?


@  -None


Exacerbation, Progression, or Severe Exacerbation]


@  -no


Poses a threat to life or bodily function?


@  -no








Disposition


Clinical Impression: 


 Conjunctivitis, left eye





Disposition: HOME SELF-CARE


Condition: Stable


Instructions (If sedation given, give patient instructions):  Conjunctivitis 

(ED)


Additional Instructions: 


Please return to the Emergency Department if symptoms worsen or any other 

concerns.


Prescriptions: 


Tobramycin 0.3% Ophth Soln [Tobrex 0.3% Ophth Soln] 1 drop LEFT EYE Q4H #5 ml


Is patient prescribed a controlled substance at d/c from ED?: No


Referrals: 


None,Stated [Primary Care Provider] - 1-2 days


Time of Disposition: 09:48

## 2024-11-08 ENCOUNTER — HOSPITAL ENCOUNTER (OUTPATIENT)
Dept: HOSPITAL 47 - LABWHC1 | Age: 29
Discharge: HOME | End: 2024-11-08
Attending: NURSE PRACTITIONER
Payer: COMMERCIAL

## 2024-11-08 DIAGNOSIS — L60.9: ICD-10-CM

## 2024-11-08 DIAGNOSIS — D69.2: Primary | ICD-10-CM

## 2024-11-08 DIAGNOSIS — D23.61: ICD-10-CM

## 2024-11-08 LAB
ALT SERPL-CCNC: 25 U/L (ref 8–44)
AST SERPL-CCNC: 19 U/L (ref 13–35)
BASOPHILS # BLD AUTO: 0.04 X 10*3/UL (ref 0–0.1)
BASOPHILS NFR BLD AUTO: 0.6 %
EOSINOPHIL # BLD AUTO: 0.07 X 10*3/UL (ref 0.04–0.35)
EOSINOPHIL NFR BLD AUTO: 1 %
ERYTHROCYTE [DISTWIDTH] IN BLOOD BY AUTOMATED COUNT: 4.78 X 10*6/UL (ref 4.1–5.2)
ERYTHROCYTE [DISTWIDTH] IN BLOOD: 12.1 % (ref 11.5–14.5)
HCT VFR BLD AUTO: 41.7 % (ref 37.2–46.3)
HGB BLD-MCNC: 14.2 G/DL (ref 12–15)
IMM GRANULOCYTES BLD QL AUTO: 0.3 %
LYMPHOCYTES # SPEC AUTO: 2.36 X 10*3/UL (ref 0.9–5)
LYMPHOCYTES NFR SPEC AUTO: 33.8 %
MCH RBC QN AUTO: 29.7 PG (ref 27–32)
MCHC RBC AUTO-ENTMCNC: 34.1 G/DL (ref 32–37)
MCV RBC AUTO: 87.2 FL (ref 80–97)
MONOCYTES # BLD AUTO: 0.45 X 10*3/UL (ref 0.2–1)
MONOCYTES NFR BLD AUTO: 6.4 %
NEUTROPHILS # BLD AUTO: 4.04 X 10*3/UL (ref 1.8–7.7)
NEUTROPHILS NFR BLD AUTO: 57.9 %
NRBC BLD AUTO-RTO: 0 X 10*3/UL (ref 0–0.01)
PLATELET # BLD AUTO: 274 X 10*3/UL (ref 140–440)
WBC # BLD AUTO: 6.98 X 10*3/UL (ref 4.5–10)

## 2024-11-08 PROCEDURE — 36415 COLL VENOUS BLD VENIPUNCTURE: CPT

## 2024-11-08 PROCEDURE — 84460 ALANINE AMINO (ALT) (SGPT): CPT

## 2024-11-08 PROCEDURE — 85025 COMPLETE CBC W/AUTO DIFF WBC: CPT

## 2024-11-08 PROCEDURE — 84450 TRANSFERASE (AST) (SGOT): CPT
